# Patient Record
Sex: MALE | Race: WHITE | NOT HISPANIC OR LATINO | Employment: OTHER | URBAN - NONMETROPOLITAN AREA
[De-identification: names, ages, dates, MRNs, and addresses within clinical notes are randomized per-mention and may not be internally consistent; named-entity substitution may affect disease eponyms.]

---

## 2024-01-24 ENCOUNTER — HOSPITAL ENCOUNTER (OUTPATIENT)
Dept: MRI IMAGING | Facility: HOSPITAL | Age: 69
Discharge: HOME | End: 2024-01-24
Admitting: NURSE PRACTITIONER
Payer: MEDICARE

## 2024-01-24 DIAGNOSIS — R41.3 MEMORY LOSS: Primary | ICD-10-CM

## 2024-01-24 DIAGNOSIS — R41.3 MEMORY LOSS: ICD-10-CM

## 2024-01-24 PROCEDURE — 70551 MRI BRAIN STEM W/O DYE: CPT | Performed by: RADIOLOGY

## 2024-01-24 PROCEDURE — 70551 MRI BRAIN STEM W/O DYE: CPT

## 2024-01-24 NOTE — PROGRESS NOTES
I tried to call him but there was no answer. Please try to contact him and let him know that his MRI shows no acute findings. If he feels like he is still having symptoms I would referral to neurology.

## 2024-04-15 ENCOUNTER — OFFICE VISIT (OUTPATIENT)
Dept: NEUROLOGY | Facility: CLINIC | Age: 69
End: 2024-04-15
Payer: MEDICARE

## 2024-04-15 VITALS
SYSTOLIC BLOOD PRESSURE: 122 MMHG | OXYGEN SATURATION: 94 % | HEIGHT: 68 IN | HEART RATE: 59 BPM | BODY MASS INDEX: 23.04 KG/M2 | WEIGHT: 152 LBS | DIASTOLIC BLOOD PRESSURE: 70 MMHG

## 2024-04-15 DIAGNOSIS — R41.3 MEMORY LOSS: Primary | ICD-10-CM

## 2024-04-15 PROCEDURE — 1159F MED LIST DOCD IN RCRD: CPT | Performed by: NURSE PRACTITIONER

## 2024-04-15 PROCEDURE — 1160F RVW MEDS BY RX/DR IN RCRD: CPT | Performed by: NURSE PRACTITIONER

## 2024-04-15 PROCEDURE — 99214 OFFICE O/P EST MOD 30 MIN: CPT | Performed by: NURSE PRACTITIONER

## 2024-04-15 NOTE — PROGRESS NOTES
"   Neuro Office Visit      Encounter Date: 04/15/2024   Patient Name: Dewayne Garvey  : 1955   MRN: [id number]   PCP:  Jamar Jurado APRN     Chief Complaint:    Chief Complaint   Patient presents with    Memory Loss       History of Present Illness: Dewayne Garvey is a 69 y.o. male who is here today in Neurology for memory loss.    Accompanied by his wife Danuta who assists with history.    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    He retired less than a year ago.    Onset of forgetting appointments/events approximately 6-8 months ago.    His wife noticed that she would tell him about plans or tell him some other information and within 15 to 30 minutes he would have already forgotten.    Danuta notices that he seems overwhelmed by information such as finances.    He is more disengaged and his wife feels he is \"not as sharp\".    Overall he does well with driving but had a recent event where he was driving to the 's office and did not remember which way to turn.  It was very alarming to Danuta as they have been to that office for over 30 years.    Denies stress, anxiety, depression.    Reviewed MRI of the brain with Dewayne and his wife.    MMSE 29/30    MRI Brain Without Contrast (2024 10:45)     Subjective      Past Medical History:   Past Medical History:   Diagnosis Date    Inguinal hernia        Past Surgical History:   Past Surgical History:   Procedure Laterality Date    BASAL CELL CARCINOMA EXCISION  2022    Nose    CLAVICLE SURGERY      INGUINAL HERNIA REPAIR         Family History:   Family History   Problem Relation Age of Onset    Heart disease Mother     Hyperlipidemia Mother     Cancer Father         hodg dis    Cancer Brother         pancreatic       Social History:   Social History     Socioeconomic History    Marital status:    Tobacco Use    Smoking status: Never    Smokeless tobacco: Never   Vaping Use    " Vaping status: Never Used   Substance and Sexual Activity    Alcohol use: Yes     Alcohol/week: 1.0 standard drink of alcohol     Types: 1 Glasses of wine per week    Drug use: No    Sexual activity: Defer       Medications:     Current Outpatient Medications:     LIALDA 1.2 G EC tablet, 2 (Two) Times a Day., Disp: , Rfl:     Allergies:   No Known Allergies    PHQ-9 Total Score:     STEADI Fall Risk Assessment was completed, and patient is at LOW risk for falls.Assessment completed on:4/15/2024    Objective     Physical Exam:   Physical Exam  Vitals reviewed.   Eyes:      Extraocular Movements: EOM normal.      Pupils: Pupils are equal, round, and reactive to light.   Neurological:      Mental Status: He is oriented to person, place, and time.      Coordination: Finger-Nose-Finger Test, Heel to Shin Test and Romberg Test normal.      Gait: Gait is intact. Tandem walk normal.      Deep Tendon Reflexes:      Reflex Scores:       Tricep reflexes are 2+ on the right side and 2+ on the left side.       Bicep reflexes are 2+ on the right side and 2+ on the left side.       Brachioradialis reflexes are 2+ on the right side and 2+ on the left side.       Patellar reflexes are 2+ on the right side and 2+ on the left side.       Achilles reflexes are 2+ on the right side and 2+ on the left side.  Psychiatric:         Speech: Speech normal.         Neurologic Exam     Mental Status   Oriented to person, place, and time.   Oriented to person.   Oriented to place. Oriented to country, city, area, street and number.   Oriented to time. Oriented to year, month, date, day and season.   Registration: recalls 3 of 3 objects. Recall at 5 minutes: recalls 2 of 3 objects.   Attention: normal. Concentration: normal.   Speech: speech is normal   Level of consciousness: alert  Knowledge: good. Able to perform simple calculations.   Able to name object. Able to read. Able to repeat. Able to write. Normal comprehension.     Cranial Nerves  "    CN II   Visual fields full to confrontation.     CN III, IV, VI   Pupils are equal, round, and reactive to light.  Extraocular motions are normal.   CN III: no CN III palsy  CN VI: no CN VI palsy    CN V   Facial sensation intact.     CN VII   Facial expression full, symmetric.     CN VIII   CN VIII normal.     CN IX, X   CN IX normal.   CN X normal.     CN XI   CN XI normal.     CN XII   CN XII normal.     Motor Exam     Strength   Right neck flexion: 5/5  Left neck flexion: 5/5  Right neck extension: 5/5  Left neck extension: 5/5  Right deltoid: 5/5  Left deltoid: 5/5  Right biceps: 5/5  Left biceps: 5/5  Right triceps: 5/5  Left triceps: 5/5  Right wrist flexion: 5/5  Left wrist flexion: 5/5  Right wrist extension: 5/5  Left wrist extension: 5/5  Right interossei: 5/5  Left interossei: 5/5  Right abdominals: 5/5  Left abdominals: 5/5  Right iliopsoas: 5/5  Left iliopsoas: 5/5  Right quadriceps: 5/5  Left quadriceps: 5/5  Right hamstrin/5  Left hamstrin/5  Right glutei: 5/5  Left glutei: 5/5  Right anterior tibial: 5/5  Left anterior tibial: 5/5  Right posterior tibial: 5/5  Left posterior tibial: 5/5  Right peroneal: 5/5  Left peroneal: 5/5  Right gastroc: 5/5  Left gastroc: 5/5    Sensory Exam   Light touch normal.     Gait, Coordination, and Reflexes     Gait  Gait: normal    Coordination   Romberg: negative  Finger to nose coordination: normal  Heel to shin coordination: normal  Tandem walking coordination: normal    Tremor   Resting tremor: absent  Intention tremor: absent  Action tremor: absent    Reflexes   Right brachioradialis: 2+  Left brachioradialis: 2+  Right biceps: 2+  Left biceps: 2+  Right triceps: 2+  Left triceps: 2+  Right patellar: 2+  Left patellar: 2+  Right achilles: 2+  Left achilles: 2+  Right : 2+  Left : 2+       Vital Signs:   Vitals:    04/15/24 0843   BP: 122/70   Pulse: 59   SpO2: 94%   Weight: 68.9 kg (152 lb)   Height: 172.7 cm (67.99\")     Body mass index is " "23.12 kg/m².     Results:   Imaging:   MRI Brain Without Contrast    Result Date: 1/24/2024    No acute findings in the head/brain.   This report was finalized on 1/24/2024 10:54 AM by Dr. Prosper Lezama MD.         Labs:    No results found for: \"CMP\", \"PROTEIN\", \"ANTIMOGAB\", \"[id number]\", \"JCVRESULT\", \"QUANTTBGOLD\", \"CBCDIF\", \"IGGALBSER\"     Assessment / Plan      Assessment/Plan:   Diagnoses and all orders for this visit:    1. Memory loss (Primary)  Comments:  Encouraged visual, shared calendar  Utilize memory games  Adequate nutrition, hydration, sleep           Patient Education:     Reviewed medications, potential side effects and signs and symptoms to report. Discussed risk versus benefits of treatment plan with patient and/or family-including medications, labs and radiology that may be ordered. Addressed questions and concerns during visit. Patient and/or family verbalized understanding and agree with plan. Instructed to call the office with any questions and report to ER with any life-threatening symptoms.     Follow Up:   Return in about 6 months (around 10/15/2024).    I spent 30  minutes face to face with the patient and family. I personally spent 50 percent of this time counseling and discussing diagnosis, diagnostic testing, driving, treatment options, and management .       During this visit the following were done:  Labs Reviewed [x]    Labs Ordered []    Radiology Reports Reviewed [x]    Radiology Ordered []    PCP Records Reviewed [x]    Referring Provider Records Reviewed [x]    ER Records Reviewed []    Hospital Records Reviewed []    History Obtained From Family []    Radiology Images Reviewed [x]    Other Reviewed []    Records Requested []      PAIGE Bonilla  [facility] [place] [facility]  [address]  [place] [facility] [address] [phone]  "

## 2024-04-16 ENCOUNTER — PATIENT ROUNDING (BHMG ONLY) (OUTPATIENT)
Dept: NEUROLOGY | Facility: CLINIC | Age: 69
End: 2024-04-16
Payer: COMMERCIAL

## 2024-10-15 ENCOUNTER — OFFICE VISIT (OUTPATIENT)
Dept: NEUROLOGY | Facility: CLINIC | Age: 69
End: 2024-10-15
Payer: MEDICARE

## 2024-10-15 VITALS
HEART RATE: 89 BPM | OXYGEN SATURATION: 97 % | BODY MASS INDEX: 22.61 KG/M2 | HEIGHT: 68 IN | SYSTOLIC BLOOD PRESSURE: 118 MMHG | WEIGHT: 149.2 LBS | DIASTOLIC BLOOD PRESSURE: 78 MMHG

## 2024-10-15 DIAGNOSIS — R41.3 MEMORY LOSS: Primary | ICD-10-CM

## 2024-10-15 PROCEDURE — 1160F RVW MEDS BY RX/DR IN RCRD: CPT | Performed by: NURSE PRACTITIONER

## 2024-10-15 PROCEDURE — 99213 OFFICE O/P EST LOW 20 MIN: CPT | Performed by: NURSE PRACTITIONER

## 2024-10-15 PROCEDURE — 1159F MED LIST DOCD IN RCRD: CPT | Performed by: NURSE PRACTITIONER

## 2024-10-15 NOTE — PROGRESS NOTES
Neuro Office Visit      Encounter Date: 10/15/2024   Patient Name: Dewayne Garvey  : 1955   MRN: [id number]   PCP:  Jamar Jurado APRN     Chief Complaint:    Chief Complaint   Patient presents with    Memory Loss     F/U       History of Present Illness: Dewayne Garvey is a 69 y.o. male who is here today in Neurology for memory loss.    Last visit with me on 4/15/2024, MMSE 29/30 encouraged adequate nutrition, hydration, sleep, memory games.  History of Present Illness  The patient presents for evaluation of memory issues. He is accompanied by his wife.    He reports a decline in his memory, which he attributes to aging. He occasionally forgets things but does not see this as a crisis. He has been using an address book to help remember things. They use a shared family calendar to keep track of appointments and events.     He does not believe he is depressed.    His wife expresses concern about his memory, noting that he often forgets conversations from the previous day.     She also mentions that he seems less engaged and more absent-minded than before.     She notes that he sometimes forgets directions, even when given immediately prior to leaving.    She believes that his memory issues may be due to fatigue or lack of interest in certain topics. She also mentions that she has taken on more responsibilities since her parents' health declined, which has kept her mind active.      MMSE 28/30    Subjective      Past Medical History:   Past Medical History:   Diagnosis Date    Inguinal hernia        Past Surgical History:   Past Surgical History:   Procedure Laterality Date    BASAL CELL CARCINOMA EXCISION  2022    Nose    CLAVICLE SURGERY      INGUINAL HERNIA REPAIR         Family History:   Family History   Problem Relation Age of Onset    Heart disease Mother     Hyperlipidemia Mother     Cancer Father         hodg dis    Cancer Brother         pancreatic       Social History:   Social  History     Socioeconomic History    Marital status:    Tobacco Use    Smoking status: Never    Smokeless tobacco: Never   Vaping Use    Vaping status: Never Used   Substance and Sexual Activity    Alcohol use: Yes     Alcohol/week: 1.0 standard drink of alcohol     Types: 1 Glasses of wine per week    Drug use: No    Sexual activity: Defer       Medications:     Current Outpatient Medications:     LIALDA 1.2 G EC tablet, 2 (Two) Times a Day., Disp: , Rfl:     Allergies:   No Known Allergies    PHQ-9 Total Score:     STEADI Fall Risk Assessment was completed, and patient is at LOW risk for falls.Assessment completed on:10/15/2024    Objective     Physical Exam:     Neurological Exam  Mental Status  Awake, alert and oriented to person, place and time. Recent and remote memory are intact. Speech is normal. Language is fluent with no aphasia. Attention and concentration are normal. Fund of knowledge is appropriate for level of education. MMSE score: 28.    Cranial Nerves  CN II: Visual acuity is normal.  CN III, IV, VI: Extraocular movements intact bilaterally. Pupils equal round and reactive to light bilaterally.  CN V: Facial sensation is normal.  CN VII: Full and symmetric facial movement.  CN IX, X: Palate elevates symmetrically  CN XI: Shoulder shrug strength is normal.  CN XII: Tongue midline without atrophy or fasciculations.    Motor  Normal muscle bulk throughout. No fasciculations present. Normal muscle tone. Strength is 5/5 throughout all four extremities.    Sensory  Sensation is intact to light touch, pinprick, vibration and proprioception in all four extremities.    Reflexes                                            Right                      Left  Brachioradialis                    2+                         2+  Biceps                                 2+                         2+  Triceps                                2+                         2+  Finger flex                           2+         "                 2+  Hamstring                            2+                         2+  Patellar                                2+                         2+  Achilles                                2+                         2+    Coordination    Finger-to-nose, rapid alternating movements and heel-to-shin normal bilaterally without dysmetria.    Gait  Normal casual, toe, heel and tandem gait.        Vital Signs:   Vitals:    10/15/24 0949   BP: 118/78   Pulse: 89   SpO2: 97%   Weight: 67.7 kg (149 lb 3.2 oz)   Height: 172.7 cm (67.99\")     Body mass index is 22.69 kg/m².     Results:   Results  Imaging  MRI results were normal.    Testing  MMSE score was 28.     Imaging:   No Images in the past 120 days found..     Labs:   No results found for: \"CMP\", \"PROTEIN\", \"ANTIMOGAB\", \"[id number]\", \"JCVRESULT\", \"QUANTTBGOLD\", \"CBCDIF\", \"IGGALBSER\"     Assessment / Plan      Assessment/Plan:   Diagnoses and all orders for this visit:    1. Memory loss (Primary)  Comments:  Consider neuropsych eval         Assessment & Plan  1. Memory issues.  His MMSE score remains within the normal range, albeit with a slight decrease from 29 to 28. The missed date was the only deviation from the previous test. An MRI scan revealed no abnormalities, suggesting that the memory issues could be attributed to the natural aging process and recent long-term. A referral to Dr. Phoebe Porter, a neuropsychologist, was recommended for further evaluation. This could provide additional insights into whether the memory issues are cognitive, depressive, or due to inattentiveness. A follow-up appointment was scheduled for 6 months later to monitor his progress. Should he experience increased memory problems before the next appointment, he is advised to contact me to arrange for testing.    Follow-up  A follow-up visit is scheduled for 6 months from now.    Patient Education:     Reviewed medications, potential side effects and signs and symptoms to " report. Discussed risk versus benefits of treatment plan with patient and/or family-including medications, labs and radiology that may be ordered. Addressed questions and concerns during visit. Patient and/or family verbalized understanding and agree with plan. Instructed to call the office with any questions and report to ER with any life-threatening symptoms.     Follow Up:   Return in about 6 months (around 4/15/2025).    I spent 45 minutes caring for Dewayne on this date of service. This time includes time spent by me in the following activities: preparing for the visit, performing a medically appropriate examination and/or evaluation, counseling and educating the patient/family/caregiver, and documenting information in the medical record.        During this visit the following were done:  Labs Reviewed []    Labs Ordered []    Radiology Reports Reviewed []    Radiology Ordered []    PCP Records Reviewed []    Referring Provider Records Reviewed []    ER Records Reviewed []    Hospital Records Reviewed []    History Obtained From Family []    Radiology Images Reviewed []    Other Reviewed []    Records Requested []      Patient or patient representative verbalized consent for the use of Ambient Listening during the visit with  PAIGE Bonilla for chart documentation. 10/15/2024  09:34 EDT    PAIGE Bonilla   [facility] [place] [facility]  [address]  [facility] [address] [phone]

## 2025-04-02 ENCOUNTER — OFFICE VISIT (OUTPATIENT)
Dept: UROLOGY | Facility: CLINIC | Age: 70
End: 2025-04-02
Payer: MEDICARE

## 2025-04-02 VITALS
WEIGHT: 148.8 LBS | BODY MASS INDEX: 22.55 KG/M2 | HEART RATE: 59 BPM | SYSTOLIC BLOOD PRESSURE: 143 MMHG | HEIGHT: 68 IN | DIASTOLIC BLOOD PRESSURE: 78 MMHG

## 2025-04-02 DIAGNOSIS — R97.20 ELEVATED PROSTATE SPECIFIC ANTIGEN (PSA): Primary | ICD-10-CM

## 2025-04-02 PROCEDURE — 84153 ASSAY OF PSA TOTAL: CPT

## 2025-04-02 PROCEDURE — 84154 ASSAY OF PSA FREE: CPT

## 2025-04-03 ENCOUNTER — PATIENT ROUNDING (BHMG ONLY) (OUTPATIENT)
Dept: UROLOGY | Facility: CLINIC | Age: 70
End: 2025-04-03
Payer: COMMERCIAL

## 2025-04-04 ENCOUNTER — RESULTS FOLLOW-UP (OUTPATIENT)
Dept: UROLOGY | Facility: CLINIC | Age: 70
End: 2025-04-04
Payer: COMMERCIAL

## 2025-04-04 DIAGNOSIS — R97.20 ELEVATED PROSTATE SPECIFIC ANTIGEN (PSA): Primary | ICD-10-CM

## 2025-04-04 LAB
PSA FREE MFR SERPL: 18 %
PSA FREE SERPL-MCNC: 1.49 NG/ML
PSA SERPL-MCNC: 8.3 NG/ML (ref 0–4)

## 2025-04-04 NOTE — TELEPHONE ENCOUNTER
Try to call the patient twice about PSA results however he did not answer.  Left voicemail on both contact numbers and advised to call back to discuss this is soon as possible.

## 2025-04-07 ENCOUNTER — TELEPHONE (OUTPATIENT)
Dept: UROLOGY | Facility: CLINIC | Age: 70
End: 2025-04-07
Payer: COMMERCIAL

## 2025-04-07 NOTE — TELEPHONE ENCOUNTER
Tried to call patient to schedule prostate biopsy and MRI, had to leave a voice mail to call me back.

## 2025-04-07 NOTE — TELEPHONE ENCOUNTER
Patient called back to discuss test results.  Patient wished to proceed forward with an MRI with and without contrast and we will get this scheduled appropriately.  Will tentatively schedule him for a biopsy pending MRI results.  Patient verbalized understanding

## 2025-04-09 ENCOUNTER — HOSPITAL ENCOUNTER (OUTPATIENT)
Dept: MRI IMAGING | Facility: HOSPITAL | Age: 70
Discharge: HOME | End: 2025-04-09
Payer: MEDICARE

## 2025-04-09 DIAGNOSIS — R97.20 ELEVATED PROSTATE SPECIFIC ANTIGEN (PSA): ICD-10-CM

## 2025-04-09 LAB — CREAT BLDA-MCNC: 1.1 MG/DL (ref 0.6–1.3)

## 2025-04-09 PROCEDURE — 25510000002 GADOBENATE DIMEGLUMINE 529 MG/ML SOLUTION

## 2025-04-09 PROCEDURE — A9577 INJ MULTIHANCE: HCPCS

## 2025-04-09 PROCEDURE — 72197 MRI PELVIS W/O & W/DYE: CPT

## 2025-04-09 PROCEDURE — 82565 ASSAY OF CREATININE: CPT

## 2025-04-09 RX ADMIN — GADOBENATE DIMEGLUMINE 13 ML: 529 INJECTION, SOLUTION INTRAVENOUS at 09:06

## 2025-04-10 ENCOUNTER — RESULTS FOLLOW-UP (OUTPATIENT)
Dept: UROLOGY | Facility: CLINIC | Age: 70
End: 2025-04-10
Payer: COMMERCIAL

## 2025-04-10 NOTE — TELEPHONE ENCOUNTER
Called patient advised that MRI did come back with PI-RADS 3 lesion however given trending prostate numbers would recommend to keep his biopsy appointment.  Did educate he may repeat a PSA prior to biopsy to see if it is decreased. he verbalized understanding.

## 2025-04-15 ENCOUNTER — OFFICE VISIT (OUTPATIENT)
Dept: NEUROLOGY | Facility: CLINIC | Age: 70
End: 2025-04-15
Payer: MEDICARE

## 2025-04-15 VITALS
SYSTOLIC BLOOD PRESSURE: 102 MMHG | OXYGEN SATURATION: 97 % | HEART RATE: 59 BPM | DIASTOLIC BLOOD PRESSURE: 64 MMHG | BODY MASS INDEX: 22.31 KG/M2 | WEIGHT: 147.2 LBS | HEIGHT: 68 IN

## 2025-04-15 DIAGNOSIS — R41.3 MEMORY LOSS: Primary | ICD-10-CM

## 2025-04-15 PROBLEM — K51.20 ULCERATIVE PROCTITIS: Status: ACTIVE | Noted: 2025-04-15

## 2025-04-15 PROCEDURE — 1160F RVW MEDS BY RX/DR IN RCRD: CPT | Performed by: NURSE PRACTITIONER

## 2025-04-15 PROCEDURE — 1159F MED LIST DOCD IN RCRD: CPT | Performed by: NURSE PRACTITIONER

## 2025-04-15 PROCEDURE — 99214 OFFICE O/P EST MOD 30 MIN: CPT | Performed by: NURSE PRACTITIONER

## 2025-04-15 NOTE — PROGRESS NOTES
Neuro Office Visit      Encounter Date: 04/15/2025   Patient Name: Dewayne Garvey  : 1955   MRN: [id number]   PCP:  Jamar Jurado APRN     Chief Complaint:    Chief Complaint   Patient presents with    Memory Loss       History of Present Illness: Dewayne Garvey is a 70 y.o. male who is here today in Neurology for memory loss.    Last visit with me on 10/15/2024, consider neurocognitive evaluation.    Accompanied by his wife who assists with history.  History of Present Illness  A decline in memory is reported, which is not perceived as critical but acknowledged as a mild concern but he feels this that prison has led to a decrease in mental discipline, contributing to forgetfulness.     His wife is extremely concerned and has observed an increase in absent-mindedness. A date book is maintained to aid his memory.     No significant issues with driving are reported, except when navigating unfamiliar areas.     There are no instances of disorientation or getting lost in familiar surroundings.     He is currently under evaluation for prostate cancer, a fact he failed to recall during today's visit.  His wife notes concern that he did not tell me about the potential diagnosis as she feels she would be hyperfocused on it.    His wife noted a change that although he maintains hygiene, he has a tendency to wear the same clothes for several days.      His wife also expresses concern about his ability to focus, retain information, and problem-solve.     These changes reportedly began around the time of prison, when adjustments to social security and health insurance were being made.     Denies feeling depressed, it is felt that he has become less engaged in daily activities and responsibilities since retiring. He functions well in social settings.    SOCIAL HISTORY  Marital Status:   Occupations: Retired professor    MMSE 28/30    Subjective      Past Medical History:   Past Medical History:    Diagnosis Date    Inguinal hernia        Past Surgical History:   Past Surgical History:   Procedure Laterality Date    BASAL CELL CARCINOMA EXCISION  09/01/2022    Nose    CLAVICLE SURGERY      INGUINAL HERNIA REPAIR         Family History:   Family History   Problem Relation Age of Onset    Heart disease Mother     Hyperlipidemia Mother     Cancer Father         hodg dis    Cancer Brother         pancreatic       Social History:   Social History     Socioeconomic History    Marital status:    Tobacco Use    Smoking status: Never    Smokeless tobacco: Never   Vaping Use    Vaping status: Never Used   Substance and Sexual Activity    Alcohol use: Yes     Alcohol/week: 1.0 standard drink of alcohol     Types: 1 Glasses of wine per week    Drug use: No    Sexual activity: Defer       Medications:     Current Outpatient Medications:     LIALDA 1.2 G EC tablet, 2 (Two) Times a Day., Disp: , Rfl:     Allergies:   No Known Allergies    PHQ-9 Total Score:     STEADI Fall Risk Assessment was completed, and patient is at LOW risk for falls.Assessment completed on:4/15/2025    Objective     Physical Exam:     Neurological Exam  Mental Status  Awake, alert and oriented to person, place and time. Recalls 3 of 3 objects immediately. At 5 minutes recalls 2 of 3 objects. Recalls 2 of 3 objects with prompting. Speech is normal. Language is fluent with no aphasia. Attention and concentration are normal. Fund of knowledge is appropriate for level of education. MMSE score: 28.    Cranial Nerves  CN II: Visual acuity is normal.  CN III, IV, VI: Extraocular movements intact bilaterally. Pupils equal round and reactive to light bilaterally.  CN V: Facial sensation is normal.  CN VII: Full and symmetric facial movement.  CN IX, X: Palate elevates symmetrically  CN XI: Shoulder shrug strength is normal.  CN XII: Tongue midline without atrophy or fasciculations.    Motor  Normal muscle bulk throughout. No fasciculations present.  "Normal muscle tone. Strength is 5/5 throughout all four extremities.    Sensory  Sensation is intact to light touch, pinprick, vibration and proprioception in all four extremities.    Reflexes                                            Right                      Left  Brachioradialis                    2+                         2+  Biceps                                 2+                         2+  Triceps                                2+                         2+  Finger flex                           2+                         2+  Hamstring                            2+                         2+  Patellar                                2+                         2+  Achilles                                2+                         2+    Coordination    Finger-to-nose, rapid alternating movements and heel-to-shin normal bilaterally without dysmetria.    Gait  Normal casual, toe, heel and tandem gait.        Vital Signs:   Vitals:    04/15/25 1037   BP: 102/64   Pulse: 59   SpO2: 97%   Weight: 66.8 kg (147 lb 3.2 oz)   Height: 172.7 cm (67.99\")     Body mass index is 22.39 kg/m².     Results:   Results  Imaging   - MRI of the brain: Some volume loss and a little bit of white matter disease.    Diagnostic Testing   - MMSE: Score was 26 out of 30.     Imaging:   MRI Prostate With & Without Contrast  Result Date: 4/10/2025  1.  Prostate enlargement with central hyperplasia constituting changes of BPH. 2.  Transitional zone: Partially circumscribed 14.7 mm lesion in the left anterior-posterior transitional zone in the mid gland-apical gland region (TZa-TZp) that demonstrates increased DWI signal, decreased ADC signal, and decreased T2W signal.  PIRADS 3 - Intermediate.  The presence of clinically significant cancer is equivocal. 3.  Urinary bladder wall thickening with trabeculation indicative of chronic partial bladder obstruction.  ASSESSMENT:  PIRADS 3 - Intermediate.  The presence of clinically significant " "cancer is equivocal.  This report was finalized on 4/10/2025 10:22 AM by Dr. Jevon Meyers MD.         Labs:   No results found for: \"CMP\", \"PROTEIN\", \"ANTIMOGAB\", \"XWJLVO9VKHS\", \"JCVRESULT\", \"QUANTTBGOLD\", \"CBCDIF\", \"IGGALBSER\"     Assessment / Plan      Assessment/Plan:   Diagnoses and all orders for this visit:    1. Memory loss (Primary)  -     Ambulatory Referral to Neuropsychology         Assessment & Plan  1. Memory impairment.  His Mini-Mental State Examination (MMSE) score has shown a slight decrease from 28/30 to 26/30, but it remains within the normal range. The MRI scan revealed minimal volume loss and mild white matter disease, which are not alarming findings. His memory issues appear to be age-related rather than indicative of a serious cognitive disorder. A referral will be made to Dr. Phoebe Porter for a neurocognitive evaluation to further assess his condition. If the evaluation indicates a cognitive issue, additional testing and potential treatment options, including new medications for dementia, will be considered.    Follow-up  The patient will follow up in 6 months.    Patient Education:     Reviewed medications, potential side effects and signs and symptoms to report. Discussed risk versus benefits of treatment plan with patient and/or family-including medications, labs and radiology that may be ordered. Addressed questions and concerns during visit. Patient and/or family verbalized understanding and agree with plan. Instructed to call the office with any questions and report to ER with any life-threatening symptoms.     Follow Up:   Return in about 6 months (around 10/15/2025).    I spent 40 minutes caring for Dewayne on this date of service. This time includes time spent by me in the following activities: preparing for the visit, performing a medically appropriate examination and/or evaluation, counseling and educating the patient/family/caregiver, referring and communicating with other health " care professionals, and documenting information in the medical record.        During this visit the following were done:  Labs Reviewed []    Labs Ordered []    Radiology Reports Reviewed []    Radiology Ordered []    PCP Records Reviewed []    Referring Provider Records Reviewed []    ER Records Reviewed []    Hospital Records Reviewed []    History Obtained From Family []    Radiology Images Reviewed []    Other Reviewed []    Records Requested []      Patient or patient representative verbalized consent for the use of Ambient Listening during the visit with  PAIGE Bonilla for chart documentation. 4/15/2025  14:22 EDT    PAIGE Bonilla   [facility] NE[facility]  [address]  [place] [address] [phone]

## 2025-04-16 ENCOUNTER — TELEPHONE (OUTPATIENT)
Dept: UROLOGY | Facility: CLINIC | Age: 70
End: 2025-04-16
Payer: COMMERCIAL

## 2025-04-22 ENCOUNTER — TELEPHONE (OUTPATIENT)
Dept: NEUROLOGY | Facility: CLINIC | Age: 70
End: 2025-04-22
Payer: COMMERCIAL

## 2025-04-22 NOTE — TELEPHONE ENCOUNTER
Phoebe Porter's office called to get the order for the referral with the Providers electronic signature on faxed over.     Can we also make sure the order with providers electronic signature comes over with the referral in the future.    Fax was sent over to [phone]

## 2025-05-01 ENCOUNTER — TELEPHONE (OUTPATIENT)
Dept: UROLOGY | Facility: CLINIC | Age: 70
End: 2025-05-01
Payer: COMMERCIAL

## 2025-05-01 ENCOUNTER — RESULTS FOLLOW-UP (OUTPATIENT)
Dept: NEUROLOGY | Facility: CLINIC | Age: 70
End: 2025-05-01
Payer: COMMERCIAL

## 2025-05-01 NOTE — TELEPHONE ENCOUNTER
Caller: SUELLEN CANO    Best call back number: [phone] (home)       What is the best time to reach you: ANYTIME    Who are you requesting to speak with (clinical staff, provider,  specific staff member): RAHEEM      What was the call regarding: PT REQUESTING TO SPEAK TO RAHEEM ABOUT BLOOD WORK, MIGHT NEED ANOTHER, TO SEE IF BIOPSY NEEDS TO BE DONE OR NOT. PLEASE CALL BACK TO ADVISE.

## 2025-05-01 NOTE — PROGRESS NOTES
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  The recommendation given was to consider a memory enhancing medication for memory protection and cognitive stability.  If he is interested in in starting one of those medicines I will be happy to write the prescription for him.

## 2025-05-01 NOTE — TELEPHONE ENCOUNTER
Routing to Diego. Diego said the patient was welcome to come in prior to the BX and see if his levels dropped back to a normal range and if so we can cancel the BX. The pt said they would come by today.

## 2025-05-02 ENCOUNTER — LAB (OUTPATIENT)
Dept: UROLOGY | Facility: CLINIC | Age: 70
End: 2025-05-02
Payer: MEDICARE

## 2025-05-02 DIAGNOSIS — R97.20 ELEVATED PROSTATE SPECIFIC ANTIGEN (PSA): Primary | ICD-10-CM

## 2025-05-02 PROCEDURE — 84153 ASSAY OF PSA TOTAL: CPT

## 2025-05-02 NOTE — TELEPHONE ENCOUNTER
"Relay     \"Per Provider, neuropsychological evaluation noted some mild cognitive decrease in the areas of visual memory and visual constructive ability.  The recommendation given was to consider a memory enhancing medication for memory protection and cognitive stability.  If he is interested in in starting one of those medicines I will be happy to write the prescription for him and to let us know\"        "

## 2025-05-03 LAB — PSA SERPL-MCNC: 7.74 NG/ML (ref 0–4)

## 2025-05-05 ENCOUNTER — RESULTS FOLLOW-UP (OUTPATIENT)
Dept: UROLOGY | Facility: CLINIC | Age: 70
End: 2025-05-05
Payer: COMMERCIAL

## 2025-05-05 NOTE — TELEPHONE ENCOUNTER
I spoke with the pt going over his PSA letting him know that it did decrease, however a biopsy in office was still recommended. The pt verbalized understanding.                       ----- Message from Alfredo Bernard sent at 5/5/2025  8:35 AM EDT -----  Please let patient know PSA did decrease to a 7.7 but we would still recommend to undergo biopsy in office.  Thank you.  ----- Message -----  From: Lab, Background User  Sent: 5/3/2025   2:38 AM EDT  To: Alfredo Bernard PA-C

## 2025-05-13 ENCOUNTER — TELEPHONE (OUTPATIENT)
Dept: NEUROLOGY | Facility: CLINIC | Age: 70
End: 2025-05-13
Payer: COMMERCIAL

## 2025-05-13 NOTE — TELEPHONE ENCOUNTER
Caller: Danuta Ragland    Relationship: Emergency Contact    Best call back number: [phone]    What is the best time to reach you: WITHIN THE NEXT FEW HOURS IF POSSIBLE. ANYTIME IN THE AFTERNOON IS ALSO GOOD. IF WE ARE NOT ABLE TO REACH HER, PLEASE LEAVE A VOICEMAIL WITH A GOOD TIME TO RETURN THE CALL WHERE PAIGE CODY CAN BE REACHED DIRECTLY.    Who are you requesting to speak with (clinical staff, provider,  specific staff member): PAIGE CODY    What was the call regarding: HAS FURTHER QUESTIONS THEY ARE CONCERNED ABOUT REGARDING THE LAST MESSAGE THAT WAS RELAYED TO THEM. PATIENT'S WIFE IS VERY ANXIOUS FOR RESULTS, AS HER FATHER IS IN THE NURSING HOME AND IS NOT EXPECTED TO LIVE LONG.    PLEASE REVIEW AND ADVISE

## 2025-05-15 ENCOUNTER — PROCEDURE VISIT (OUTPATIENT)
Dept: UROLOGY | Facility: CLINIC | Age: 70
End: 2025-05-15
Payer: MEDICARE

## 2025-05-15 VITALS
WEIGHT: 146.2 LBS | HEIGHT: 68 IN | HEART RATE: 67 BPM | BODY MASS INDEX: 22.16 KG/M2 | SYSTOLIC BLOOD PRESSURE: 136 MMHG | DIASTOLIC BLOOD PRESSURE: 91 MMHG

## 2025-05-15 DIAGNOSIS — R97.20 ELEVATED PROSTATE SPECIFIC ANTIGEN (PSA): Primary | ICD-10-CM

## 2025-05-15 RX ORDER — GENTAMICIN 40 MG/ML
80 INJECTION, SOLUTION INTRAMUSCULAR; INTRAVENOUS ONCE
Status: COMPLETED | OUTPATIENT
Start: 2025-05-15 | End: 2025-05-15

## 2025-05-15 RX ADMIN — GENTAMICIN 80 MG: 40 INJECTION, SOLUTION INTRAMUSCULAR; INTRAVENOUS at 13:39

## 2025-05-15 NOTE — PROGRESS NOTES
Chief Complaint:      Chief Complaint   Patient presents with    Elevated PSA     Prostate biopsy        HPI:   70 y.o. male with an elevated PSA at 7.740.  He presents today for prostate biopsy.  He had a PI-RADS 3 lesion noted on the MRI scan    Past Medical History:     Past Medical History:   Diagnosis Date    Inguinal hernia        Current Meds:     Current Outpatient Medications   Medication Sig Dispense Refill    ciprofloxacin (Cipro) 500 MG tablet Take one tablet twice a day before procedure 4 tablet 0    diazePAM (VALIUM) 10 MG tablet One tablet night before procedure at bedtime and one morning of one hour prior to procedure 2 tablet 0    LIALDA 1.2 G EC tablet 2 (Two) Times a Day.      Sodium Phosphates ([employer] Enema Disposable) 19-7 GM/118ML enema Use morning of the procedure 1 enema 0     No current facility-administered medications for this visit.        Allergies:      No Known Allergies     Past Surgical History:     Past Surgical History:   Procedure Laterality Date    BASAL CELL CARCINOMA EXCISION  09/01/2022    Nose    CLAVICLE SURGERY      INGUINAL HERNIA REPAIR         Social History:     Social History     Socioeconomic History    Marital status:    Tobacco Use    Smoking status: Never    Smokeless tobacco: Never   Vaping Use    Vaping status: Never Used   Substance and Sexual Activity    Alcohol use: Yes     Alcohol/week: 1.0 standard drink of alcohol     Types: 1 Glasses of wine per week    Drug use: No    Sexual activity: Defer       Family History:     Family History   Problem Relation Age of Onset    Heart disease Mother     Hyperlipidemia Mother     Cancer Father         hodg dis    Cancer Brother         pancreatic       Review of Systems:     Review of Systems   Constitutional: Negative.    HENT: Negative.     Eyes: Negative.    Respiratory: Negative.     Cardiovascular: Negative.    Gastrointestinal: Negative.    Endocrine: Negative.    Musculoskeletal: Negative.     Allergic/Immunologic: Negative.    Neurological: Negative.    Hematological: Negative.    Psychiatric/Behavioral: Negative.         Physical Exam:     Physical Exam  Vitals and nursing note reviewed.   Constitutional:       Appearance: He is well-developed.   HENT:      Head: Normocephalic and atraumatic.   Eyes:      Conjunctiva/sclera: Conjunctivae normal.      Pupils: Pupils are equal, round, and reactive to light.   Cardiovascular:      Rate and Rhythm: Normal rate and regular rhythm.      Heart sounds: Normal heart sounds.   Pulmonary:      Effort: Pulmonary effort is normal.      Breath sounds: Normal breath sounds.   Abdominal:      General: Bowel sounds are normal.      Palpations: Abdomen is soft.   Musculoskeletal:         General: Normal range of motion.      Cervical back: Normal range of motion.   Skin:     General: Skin is warm and dry.   Neurological:      Mental Status: He is alert and oriented to person, place, and time.      Deep Tendon Reflexes: Reflexes are normal and symmetric.   Psychiatric:         Behavior: Behavior normal.         Thought Content: Thought content normal.         Judgment: Judgment normal.         I have reviewed the following portions of the patient's history: Allergies, current medications, past family history, past medical history, past social history, past surgical history, problem list, and ROS and confirm it is accurate.    Recent Image (CT and/or KUB):      CT Abdomen and Pelvis: No results found for this or any previous visit.       CT Stone Protocol: No results found for this or any previous visit.       KUB: No results found for this or any previous visit.       Labs (past 3 months):      Lab on 05/02/2025   Component Date Value Ref Range Status    PSA 05/02/2025 7.740 (H)  0.000 - 4.000 ng/mL Final   Hospital Outpatient Visit on 04/09/2025   Component Date Value Ref Range Status    Creatinine 04/09/2025 1.10  0.60 - 1.30 mg/dL Final    Serial Number:  [id number]:  [id number]   Office Visit on 04/02/2025   Component Date Value Ref Range Status    PSA 04/02/2025 8.3 (H)  0.0 - 4.0 ng/mL Final    Roche ECLIA methodology.  According to the American Urological Association, Serum PSA should  decrease and remain at undetectable levels after radical  prostatectomy. The AUA defines biochemical recurrence as an initial  PSA value 0.2 ng/mL or greater followed by a subsequent confirmatory  PSA value 0.2 ng/mL or greater.  Values obtained with different assay methods or kits cannot be used  interchangeably. Results cannot be interpreted as absolute evidence  of the presence or absence of malignant disease.    PSA, Free 04/02/2025 1.49  N/A ng/mL Final    Roche ECLIA methodology.    PSA, Free % 04/02/2025 18.0  % Final    The table below lists the probability of prostate cancer for  men with non-suspicious LIEN results and total PSA between  4 and 10 ng/mL, by patient age (Angel et al, SOLOMON 1998,  279:1542).                    % Free PSA       50-64 yr        65-75 yr                    0.00-10.00%        56%             55%                   10.01-15.00%        24%             35%                   15.01-20.00%        17%             23%                   20.01-25.00%        10%             20%                        >25.00%         5%              9%  Please note:  Angel et al did not make specific                recommendations regarding the use of                percent free PSA for any other population                of men.        Procedure:   Prostate ultrasound and biopsy:  70 year-old white male with a history of an elevated PSA and a significant increase in his risk for prostate cancer.  We discussed the prostate biopsy at length.  We discussed the significant risks of anesthesia, bleeding, infection, urosepsis, purported effects on erectile function, and rectal bleeding requiring surgical intervention.  He was given a pre-procedural enema.  He was pretreated  with ciprofloxacin for 3 days.  He was given periprocedural gentamicin at the dose of 80 mg in an intramuscular fashion and given post biopsy clindamycin for 3 days.  Following an extensive informed consent, he was brought to the operative suite, placed in the left lateral decubitus position.  He was given his prophylactic gentamicin.  The rectum was anesthetized with 20 mL of 2% viscous Xylocaine jelly.  I then used the BK biplanar probe inserted into the rectum and made measurements of the prostate.  The height was 4.71 cm, the width was 5.99 cm, and the length was 5.32 cm for a volume of 78.05 g.  There were no obvious hypoechoic areas.  There was no intravesical median lobe.  There was minimal calcification between the transition and peripheral zone.  I then injected 20 mL of 1% Xylocaine in the perirectal area and raised of skin wheal to provide anesthesia after an adequate period of topical anesthesia. I used the Biopty gun to take a total of 10 cores without complication.  He tolerated this extremely well.  Cores.  There was no bleeding or complications.   Impression: Elevated PSA s/p biopsy.    Assessment/Plan:   Elevated prostate specific antigen-we discussed the diagnosis of elevated prostate-specific antigen.  I explained the pathophysiology of PSA.  It is a serine protease that's function in the male reproductive tract is to facilitate the liquefaction of semen.  It is for this reason the body does not want it freely floating in the serum and why typically bound tightly to albumin.  We discussed why we used both a PSA free and total to determine the need for more aggressive therapy. I discussed the normal range.  Additionally, it was in the range of 1 to 4, but more recently has been downgraded to something less than 2 or even approaching 1.  I discussed the risk of family history, particularly the fact that the average male has a 14% risk of prostate cancer and that in the face of a positive diagnosis  in a father it will tablet and any other first-generation relative continued tablet insofar that a father and brother with prostate cancer will produce almost a 50% risk of prostate cancer.  I discussed the use of the temporal use of PSA as the best option for monitoring.  We also discussed the fact that an elevated PSA is an isolated event does not mean that this is prostate cancer and should not engender worry in this regard. I discussed other things that can elevate PSA including constipation, prostatitis, infection, recent intercourse etc., as well as the risks and benefits associated with this.  Also discussed the fact that this is with a dilutional test and as a consequence of such were present produce slight variations on a single specimen.  Further discussed the risks and benefits of a prostate biopsy as well.              This document has been electronically signed by SANDY KHALIL MD May 15, 2025 13:01 EDT    Dictated Utilizing Dragon Dictation: Part of this note may be an electronic transcription/translation of spoken language to printed text using the Dragon Dictation System.

## 2025-05-19 LAB — REF LAB TEST METHOD: NORMAL

## 2025-05-20 ENCOUNTER — TELEPHONE (OUTPATIENT)
Dept: UROLOGY | Facility: CLINIC | Age: 70
End: 2025-05-20
Payer: COMMERCIAL

## 2025-05-20 NOTE — TELEPHONE ENCOUNTER
Manda Stern called from pathology and is sending over patient's pathology report. He is suspicious for prostate cancer.

## 2025-05-22 ENCOUNTER — OFFICE VISIT (OUTPATIENT)
Dept: UROLOGY | Facility: CLINIC | Age: 70
End: 2025-05-22
Payer: MEDICARE

## 2025-05-22 VITALS
SYSTOLIC BLOOD PRESSURE: 160 MMHG | BODY MASS INDEX: 22.22 KG/M2 | HEIGHT: 68 IN | DIASTOLIC BLOOD PRESSURE: 83 MMHG | HEART RATE: 59 BPM | WEIGHT: 146.6 LBS

## 2025-05-22 DIAGNOSIS — R97.20 ELEVATED PROSTATE SPECIFIC ANTIGEN (PSA): Primary | ICD-10-CM

## 2025-05-22 NOTE — PROGRESS NOTES
Chief Complaint:      Chief Complaint   Patient presents with    1 week post op prostate biopsy        HPI:   70 y.o. male returns today status post a biopsy no agent orange exposure positive family history no complications including sepsis urinary retention hematuria I went to get a connect test I will follow-up with him based on this    Past Medical History:     Past Medical History:   Diagnosis Date    Inguinal hernia        Current Meds:     Current Outpatient Medications   Medication Sig Dispense Refill    LIALDA 1.2 G EC tablet 2 (Two) Times a Day.       No current facility-administered medications for this visit.        Allergies:      No Known Allergies     Past Surgical History:     Past Surgical History:   Procedure Laterality Date    BASAL CELL CARCINOMA EXCISION  09/01/2022    Nose    CLAVICLE SURGERY      INGUINAL HERNIA REPAIR         Social History:     Social History     Socioeconomic History    Marital status:    Tobacco Use    Smoking status: Never    Smokeless tobacco: Never   Vaping Use    Vaping status: Never Used   Substance and Sexual Activity    Alcohol use: Yes     Alcohol/week: 1.0 standard drink of alcohol     Types: 1 Glasses of wine per week    Drug use: No    Sexual activity: Defer       Family History:     Family History   Problem Relation Age of Onset    Heart disease Mother     Hyperlipidemia Mother     Cancer Father         hodg dis    Cancer Brother         pancreatic       Review of Systems:     Review of Systems   Constitutional: Negative.    HENT: Negative.     Eyes: Negative.    Respiratory: Negative.     Cardiovascular: Negative.    Gastrointestinal: Negative.    Endocrine: Negative.    Musculoskeletal: Negative.    Allergic/Immunologic: Negative.    Neurological: Negative.    Hematological: Negative.    Psychiatric/Behavioral: Negative.         Physical Exam:     Physical Exam  Vitals and nursing note reviewed.   Constitutional:       Appearance: He is well-developed.    HENT:      Head: Normocephalic and atraumatic.   Eyes:      Conjunctiva/sclera: Conjunctivae normal.      Pupils: Pupils are equal, round, and reactive to light.   Cardiovascular:      Rate and Rhythm: Normal rate and regular rhythm.      Heart sounds: Normal heart sounds.   Pulmonary:      Effort: Pulmonary effort is normal.      Breath sounds: Normal breath sounds.   Abdominal:      General: Bowel sounds are normal.      Palpations: Abdomen is soft.   Musculoskeletal:         General: Normal range of motion.      Cervical back: Normal range of motion.   Skin:     General: Skin is warm and dry.   Neurological:      Mental Status: He is alert and oriented to person, place, and time.      Deep Tendon Reflexes: Reflexes are normal and symmetric.   Psychiatric:         Behavior: Behavior normal.         Thought Content: Thought content normal.         Judgment: Judgment normal.         I have reviewed the following portions of the patient's history: Allergies, current medications, past family history, past medical history, past social history, past surgical history, problem list, and ROS and confirm it is accurate.    Recent Image (CT and/or KUB):      CT Abdomen and Pelvis: No results found for this or any previous visit.       CT Stone Protocol: No results found for this or any previous visit.       KUB: No results found for this or any previous visit.       Labs (past 3 months):      Procedure visit on 05/15/2025   Component Date Value Ref Range Status    Reference Lab Report 05/15/2025    Final                    Value:[facility]  [address] [place] [address]  Phone: [phone] or [phone]  Fax: [phone]  Rodger Hay M.D., Medical Director    PATIENT NAME                           LABORATORY NO.  [id number]  SUELLEN CANO CALLIE.                    [id number]  [id number]                         AGE              SEX  SSN           CLIENT REF #  [facility]                      70      1955      [id number]   [id number]    GASTROENTEROLOGY & UROLOGY         REQUESTING M.D.     ATTENDING M.D.     COPY TO.  DARIUSZ KHALIL,  [address] [address]                   DATE COLLECTED      DATE RECEIVED      DATE REPORTED  05/15/2025          05/15/2025         05/19/2025    DIAGNOSIS:  A.   PROSTATE, NEEDLE CORE BIOPSY, RIGHT MID:  Benign prostatic tissue  B.   PROSTATE, NEEDLE CORE BIOPSY, RIGHT BASE:  Atypical small acinar proliferation (see comment)  C.   PROSTATE, NEEDLE CORE                           BIOPSY, RIGHT LATERAL MID:  Benign prostatic tissue  D.   PROSTATE, NEEDLE CORE BIOPSY, RIGHT LATERAL BASE:  Benign prostatic tissue  E.   PROSTATE, NEEDLE CORE BIOPSY, LEFT MID:  Benign prostatic tissue with focal acute inflammation  F.   PROSTATE, NEEDLE CORE BIOPSY, LEFT BASE:  Benign prostatic tissue  G.   PROSTATE, NEEDLE CORE BIOPSY, LEFT LATERAL MID:  Atypical small acinar proliferation, highly suspicious for malignancy (see  comment)  H.   PROSTATE, NEEDLE CORE BIOPSY, LEFT LATERAL BASE:  Benign prostatic tissue    SEE COMMENT.     CAM    COMMENT:  PIN-4 stains with appropriate controls are performed on blocks  B1 and G1 to further evaluate rare atypical glands.  The glands of interest in  specimen B show absence of a basal cell layer on HMWCK and p63 and absence  of cytoplasmic reactivity with racemase, supporting atypical small acinar  proliferation.  The glands of interest in specimen G show absence of a basal cell  layer on HMWCK and p63 accompanied by apical cytoplasmic                           reactivity with  racemase, highly suspicious for malignancy.  Additional sampling of this area  may be of benefit.    CLINICAL HISTORY:  Elevated prostate specific antigen (PSA)    SPECIMENS RECEIVED:  A.  PROSTATE, NEEDLE CORE BIOPSY , RIGHT MID  B.  PROSTATE, NEEDLE CORE BIOPSY , RIGHT BASE  C.   "PROSTATE, NEEDLE CORE BIOPSY , RIGHT LATERAL MID  D.  PROSTATE, NEEDLE CORE BIOPSY , RIGHT LATERAL BASE  E.  PROSTATE, NEEDLE CORE BIOPSY , LEFT MID  F.  PROSTATE, NEEDLE CORE BIOPSY , LEFT BASE  G.  PROSTATE, NEEDLE CORE BIOPSY , LEFT LATERAL MID  H.  PROSTATE, NEEDLE CORE BIOPSY , LEFT LATERAL BASE    MICROSCOPIC DESCRIPTION:  Tissue blocks are prepared and slides are examined microscopically on all  specimens. See diagnosis for details.    The internal and external (both positive and negative) controls reacted  appropriately. Some of our immunohistochemical and in situ hybridization  studies are performed as analyte specific reagents. The following statement  applies to those tests: This                           test was developed, and its performance  characteristics determined by [facility]. It has not been  cleared or approved by the US Food and Drug Administration. However, the  FDA has determined that approval and clearance are not necessary.    Professional interpretation rendered by Savannah Trujillo M.D., F.C.A.P. at  [facility], [employer], [address] [place] [address].    GROSS DESCRIPTION:  A.  Labeled \"right mid\", consisting of 1 tan needle core measuring 1.5 x 0.1 x  0.1 cm, submitted entirely in 1 cassette.  SOG  B.  Labeled \"right base\", consisting of 1 tan needle core measuring 2.1 x 0.1 x  0.1 cm, submitted entirely in 1 cassette.  C.  Labeled \"right lateral mid\", consisting of 1 tan needle core measuring 1.5 x  0.1 x 0.1 cm, submitted entirely in 1 cassette.  D.  Labeled \"right lateral base\", consisting of 1 tan needle core measuring 0.9  x 0.1 x 0.1 cm, submitted entirely 1 cassette.  E.  Labeled \"left mid\", consisting of 1 tan needle core measuring 1.6 x 0.1                           x  0.1 cm, submitted entirely 1 cassette.  F.  Labeled \"left base\", consisting of 1 tan needle core measuring 1.5 x 0.1 x  0.1 cm, submitted entirely 1 cassette.  G.  Labeled \"left lateral mid\", " "consisting of 1 tan needle core measuring 1.4 x  0.1 x 0.1 cm, submitted entirely 1 cassette.  H.  Labeled \"left lateral base\", consisting of 1 tan needle core measuring 0.8 x  0.1 x 0.1 cm, submitted entirely in 1 cassette.    REVIEWED, DIAGNOSED AND ELECTRONICALLY  SIGNED BY:    Savannah Trujillo M.D., FNessaCNessaANessaP.  CPT CODES:  88305x8, 88344x2     Lab on 05/02/2025   Component Date Value Ref Range Status    PSA 05/02/2025 7.740 (H)  0.000 - 4.000 ng/mL Final   Hospital Outpatient Visit on 04/09/2025   Component Date Value Ref Range Status    Creatinine 04/09/2025 1.10  0.60 - 1.30 mg/dL Final    Serial Number: [id number]:  [id number]   Office Visit on 04/02/2025   Component Date Value Ref Range Status    PSA 04/02/2025 8.3 (H)  0.0 - 4.0 ng/mL Final    Roche ECLIA methodology.  According to the American Urological Association, Serum PSA should  decrease and remain at undetectable levels after radical  prostatectomy. The AUA defines biochemical recurrence as an initial  PSA value 0.2 ng/mL or greater followed by a subsequent confirmatory  PSA value 0.2 ng/mL or greater.  Values obtained with different assay methods or kits cannot be used  interchangeably. Results cannot be interpreted as absolute evidence  of the presence or absence of malignant disease.    PSA, Free 04/02/2025 1.49  N/A ng/mL Final    Roche ECLIA methodology.    PSA, Free % 04/02/2025 18.0  % Final    The table below lists the probability of prostate cancer for  men with non-suspicious LIEN results and total PSA between  4 and 10 ng/mL, by patient age (Angel et al, SOLOMON 1998,  279:1542).                    % Free PSA       50-64 yr        65-75 yr                    0.00-10.00%        56%             55%                   10.01-15.00%        24%             35%                   15.01-20.00%        17%             23%                   20.01-25.00%        10%             20%                        >25.00%         5%              9%  Please note:  " Angel et al did not make specific                recommendations regarding the use of                percent free PSA for any other population                of men.        Procedure:       Assessment/Plan:   Elevated prostate specific antigen-we discussed the diagnosis of elevated prostate-specific antigen.  I explained the pathophysiology of PSA.  It is a serine protease that's function in the male reproductive tract is to facilitate the liquefaction of semen.  It is for this reason the body does not want it freely floating in the serum and why typically bound tightly to albumin.  We discussed why we used both a PSA free and total to determine the need for more aggressive therapy. I discussed the normal range.  Additionally, it was in the range of 1 to 4, but more recently has been downgraded to something less than 2 or even approaching 1.  I discussed the risk of family history, particularly the fact that the average male has a 14% risk of prostate cancer and that in the face of a positive diagnosis in a father it will tablet and any other first-generation relative continued tablet insofar that a father and brother with prostate cancer will produce almost a 50% risk of prostate cancer.  I discussed the use of the temporal use of PSA as the best option for monitoring.  We also discussed the fact that an elevated PSA is an isolated event does not mean that this is prostate cancer and should not engender worry in this regard. I discussed other things that can elevate PSA including constipation, prostatitis, infection, recent intercourse etc., as well as the risks and benefits associated with this.  Also discussed the fact that this is with a dilutional test and as a consequence of such were present produce slight variations on a single specimen.  Further discussed the risks and benefits of a prostate biopsy as well.  Status post a negative biopsy will send biopsy test for connect to determine methylation  status.            This document has been electronically signed by SANDY KHALIL MD May 22, 2025 10:56 EDT    Dictated Utilizing Dragon Dictation: Part of this note may be an electronic transcription/translation of spoken language to printed text using the Dragon Dictation System.

## 2025-06-10 PROBLEM — K40.90 LEFT INGUINAL HERNIA: Status: ACTIVE | Noted: 2025-06-10

## 2025-06-10 NOTE — PROGRESS NOTES
Neuro Office Visit      Encounter Date: 2025   Patient Name: Dewayne Garvey  : 1955   MRN: [id number]   PCP:  Jamar Jurado APRN     Chief Complaint:    Chief Complaint   Patient presents with    Memory Loss       History of Present Illness: Dewayne Garvey is a 70 y.o. male who is here today in Neurology for memory loss.    Last visit with me on 4/15/2025, MMSE 28/30.  Referred to neuropsychology.    Neuropsychology evaluation was conducted on 2025 and showed mild cognitive decrease in visual memory and visual constructive ability.  Recommendations from psychologist Alexa Maciel MS included initiation of a memory enhancing medication for memory protection and cognitive stability.  History of Present Illness  Dewayne is accompanied by his wife who assists with history.    He has been experiencing difficulties with visual-spatial tasks, such as reading his datebook and remembering appointments.  Aligns with the findings of the neuropsychology evaluation.     He occasionally engages in crossword puzzles and performs well in them.    He is aware of his memory issues but does not perceive them as consistently problematic or distressing.     He continues to maintain social engagements and has not isolated himself.     He has not found a full-time or part-time activity that he desires to move he engage in, which may not be a job but could keep him intellectually stimulated.     Mrs. Garvey feels that Dewayne appears more like himself when he engages with others.     He reports no feelings of depression, but there are some symptoms indicative of a lack of productivity and diminished interest in previously enjoyed activities.     He has been sleeping more, which is uncharacteristic for him.    SOCIAL HISTORY:    Occupations: Worked in [employer]    Sleep: Excessive sleeping, uncharacteristic for him    FAMILY HISTORY  - Negative for dementia and Alzheimer's disease in first-degree  relatives.    NEUROPSYCHOLOGICAL EVAL - SCAN - [facility] NEUROPSYCHOLOGICAL SCREENING EVALUATION NEURO BRANN 04/28/2025 (04/28/2025)       Subjective      Past Medical History:   Past Medical History:   Diagnosis Date    Inguinal hernia        Past Surgical History:   Past Surgical History:   Procedure Laterality Date    BASAL CELL CARCINOMA EXCISION  09/01/2022    Nose    CLAVICLE SURGERY      INGUINAL HERNIA REPAIR         Family History:   Family History   Problem Relation Age of Onset    Heart disease Mother     Hyperlipidemia Mother     Cancer Father         hodg dis    Cancer Brother         pancreatic       Social History:   Social History     Socioeconomic History    Marital status:    Tobacco Use    Smoking status: Never    Smokeless tobacco: Never   Vaping Use    Vaping status: Never Used   Substance and Sexual Activity    Alcohol use: Yes     Alcohol/week: 1.0 standard drink of alcohol     Types: 1 Glasses of wine per week    Drug use: No    Sexual activity: Defer       Medications:     Current Outpatient Medications:     LIALDA 1.2 G EC tablet, 2 (Two) Times a Day., Disp: , Rfl:     Allergies:   No Known Allergies    PHQ-9 Total Score:     STEADI Fall Risk Assessment was completed, and patient is at LOW risk for falls.Assessment completed on:6/11/2025    Objective     Physical Exam:     Neurological Exam  Mental Status  Awake, alert and oriented to person, place and time. Recent and remote memory are intact. Speech is normal. Language is fluent with no aphasia. Attention and concentration are normal. Fund of knowledge is appropriate for level of education.    Cranial Nerves  CN II: Visual acuity is normal.  CN III, IV, VI: Extraocular movements intact bilaterally. Pupils equal round and reactive to light bilaterally.  CN V: Facial sensation is normal.  CN VII: Full and symmetric facial movement.  CN IX, X: Palate elevates symmetrically  CN XI: Shoulder shrug strength is normal.  CN XII: Tongue  "midline without atrophy or fasciculations.    Motor  Normal muscle bulk throughout. No fasciculations present. Normal muscle tone. Strength is 5/5 throughout all four extremities.    Sensory  Sensation is intact to light touch, pinprick, vibration and proprioception in all four extremities.    Reflexes                                            Right                      Left  Brachioradialis                    2+                         2+  Biceps                                 2+                         2+  Triceps                                2+                         2+  Finger flex                           2+                         2+  Hamstring                            2+                         2+  Patellar                                2+                         2+  Achilles                                2+                         2+    Coordination    Finger-to-nose, rapid alternating movements and heel-to-shin normal bilaterally without dysmetria.    Gait  Normal casual, toe, heel and tandem gait.        Vital Signs:   Vitals:    06/11/25 0927   BP: 102/70   Pulse: 57   SpO2: 96%   Weight: 67.9 kg (149 lb 12.8 oz)   Height: 170.2 cm (67\")     Body mass index is 23.46 kg/m².     Results:   Results  Imaging   - MRI of the brain: Mild age-related changes, no signs of dementia    Diagnostic Testing   - Memory test: Normal   - MMSE: Scores have been 26 and 28     Imaging:   MRI Prostate With & Without Contrast  Result Date: 4/10/2025  1.  Prostate enlargement with central hyperplasia constituting changes of BPH. 2.  Transitional zone: Partially circumscribed 14.7 mm lesion in the left anterior-posterior transitional zone in the mid gland-apical gland region (TZa-TZp) that demonstrates increased DWI signal, decreased ADC signal, and decreased T2W signal.  PIRADS 3 - Intermediate.  The presence of clinically significant cancer is equivocal. 3.  Urinary bladder wall thickening with trabeculation " "indicative of chronic partial bladder obstruction.  ASSESSMENT:  PIRADS 3 - Intermediate.  The presence of clinically significant cancer is equivocal.  This report was finalized on 4/10/2025 10:22 AM by Dr. Jevon Meyers MD.         Labs:   No results found for: \"CMP\", \"PROTEIN\", \"ANTIMOGAB\", \"GFWIZD1YJTC\", \"JCVRESULT\", \"QUANTTBGOLD\", \"CBCDIF\", \"IGGALBSER\"     Assessment / Plan      Assessment/Plan:   Diagnoses and all orders for this visit:    1. Memory loss (Primary)  Comments:  Engage in cognitive games  Consider cognitive enhancer         Assessment & Plan  1. Memory loss.  The memory test results remain within normal parameters, and the MRI scan does not indicate any abnormalities. There is a noted issue with visual memory and visual constructive memory, but executive function remains intact. The primary concern is mild memory loss, which could be attributed to stress or significant life changes such as longterm. There is no evidence to suggest dementia. The possibility of speech therapy was discussed, but the challenge lies in finding an outpatient speech therapist. The use of memory-enhancing medications such as donepezil or memantine was also considered. The potential side effects of these medications were discussed, including gastrointestinal upset and nightmares. The use of the Elevate pamela was suggested as a tool to improve visual and visual constructive memory. The possibility of conducting a P-tau 217 blood test to detect amyloid plaque was also discussed, contingent on insurance coverage. He was advised to engage in activities that stimulate visual memory, such as puzzles, for a few months to assess their effectiveness. If these activities prove beneficial, they should be continued. If not, the initiation of medication will be considered. He was encouraged to maintain social interactions and stimulation. If there is no improvement in his condition, the use of memory-enhancing medications will be " considered. If the P-tau 217 blood test reveals the presence of amyloid plaque, a referral to a memory care specialist will be made for consideration of medication.    Follow-up  The patient is scheduled for a follow-up visit in October 2025.    Patient Education:     Reviewed medications, potential side effects and signs and symptoms to report. Discussed risk versus benefits of treatment plan with patient and/or family-including medications, labs and radiology that may be ordered. Addressed questions and concerns during visit. Patient and/or family verbalized understanding and agree with plan. Instructed to call the office with any questions and report to ER with any life-threatening symptoms.     Follow Up:   Return for Next scheduled follow up.    I spent 30 minutes caring for Dewayne on this date of service. This time includes time spent by me in the following activities: preparing for the visit, performing a medically appropriate examination and/or evaluation, counseling and educating the patient/family/caregiver, and documenting information in the medical record.        During this visit the following were done:  Labs Reviewed []    Labs Ordered []    Radiology Reports Reviewed []    Radiology Ordered []    PCP Records Reviewed []    Referring Provider Records Reviewed []    ER Records Reviewed []    Hospital Records Reviewed []    History Obtained From Family []    Radiology Images Reviewed []    Other Reviewed [x]    Records Requested []      Patient or patient representative verbalized consent for the use of Ambient Listening during the visit with  PAIGE Bonilla for chart documentation. 6/11/2025  09:31 EDT    PAIGE Bonilla   [facility]  [address] [place] [address] [address]  [facility] [id number] [phone]

## 2025-06-11 ENCOUNTER — OFFICE VISIT (OUTPATIENT)
Dept: NEUROLOGY | Facility: CLINIC | Age: 70
End: 2025-06-11
Payer: MEDICARE

## 2025-06-11 VITALS
OXYGEN SATURATION: 96 % | WEIGHT: 149.8 LBS | HEIGHT: 67 IN | DIASTOLIC BLOOD PRESSURE: 70 MMHG | SYSTOLIC BLOOD PRESSURE: 102 MMHG | BODY MASS INDEX: 23.51 KG/M2 | HEART RATE: 57 BPM

## 2025-06-11 DIAGNOSIS — R41.3 MEMORY LOSS: Primary | ICD-10-CM

## 2025-06-11 PROCEDURE — 1159F MED LIST DOCD IN RCRD: CPT | Performed by: NURSE PRACTITIONER

## 2025-06-11 PROCEDURE — 1160F RVW MEDS BY RX/DR IN RCRD: CPT | Performed by: NURSE PRACTITIONER

## 2025-06-11 PROCEDURE — 99214 OFFICE O/P EST MOD 30 MIN: CPT | Performed by: NURSE PRACTITIONER

## 2025-06-17 LAB — REF LAB TEST METHOD: NORMAL

## 2025-07-08 ENCOUNTER — TELEPHONE (OUTPATIENT)
Dept: UROLOGY | Facility: CLINIC | Age: 70
End: 2025-07-08
Payer: COMMERCIAL

## 2025-07-08 NOTE — TELEPHONE ENCOUNTER
We got the results for the Conform MDX testing Doc ordered he reviewed it and so did Diego and the appointment he has is appropriate and the likelihood of cancer in repeat BX is 53 % the pt was made aware.

## 2025-07-23 ENCOUNTER — TELEPHONE (OUTPATIENT)
Dept: NEUROLOGY | Facility: CLINIC | Age: 70
End: 2025-07-23
Payer: COMMERCIAL

## 2025-07-23 DIAGNOSIS — R41.3 MEMORY LOSS: Primary | ICD-10-CM

## 2025-07-23 RX ORDER — DONEPEZIL HYDROCHLORIDE 5 MG/1
5 TABLET, FILM COATED ORAL TAKE AS DIRECTED
Qty: 60 TABLET | Refills: 6 | Status: SHIPPED | OUTPATIENT
Start: 2025-07-23 | End: 2026-07-23

## 2025-07-23 NOTE — TELEPHONE ENCOUNTER
Provider: TONI CRUM    Caller: Danuta Ragland    Relationship to Patient: Emergency Contact    Pharmacy: [id number]    Phone Number: [phone]    Reason for Call: STATES PATIENT'S COGNITIVE IMPAIRMENT HAS WORSENED. [facility] PATIENT WENT TO THE TAX OFFICE ON HIS OWN THINKING THEY HADN'T FILED THEIR 2024 TAXES. WOULD LIKE TO DISCUSS STARTING MEDICATION FOR PATIENT. PLEASE REVIEW & ADVISE, THANK YOU.

## 2025-07-23 NOTE — TELEPHONE ENCOUNTER
Referral sent.  I would advise her to call and get him on the cancellation list as it can take several months to secure an appointment.

## 2025-07-23 NOTE — TELEPHONE ENCOUNTER
Relayed Providers message to the Pt Spouse Danuta,    Please let them know that I sent a prescription for Aricept to their pharmacy. He will take 1 tablet every night for 7 days and then increase to 2 tablets nightly. Since the memory symptoms are getting worse and he is still so young I would like to see if he is a candidate for the new memory medications. These medicines are not currently used here but they are at [facility] and probably at [facility]. They also have more in-depth testing that can tell us if he has any amyloid plaque in his brain which would point to dementia or Alzheimer's. I feel that his age we should do everything we can to try to preserve the memory is much as possible.     Pt spouse verbalized understanding.      Yes to referral to [facility] Cano

## 2025-07-23 NOTE — TELEPHONE ENCOUNTER
Pt spouse not available. Message will be relayed to have spouse  call office back.    Understanding was verbalized.

## 2025-07-24 ENCOUNTER — TELEPHONE (OUTPATIENT)
Dept: NEUROLOGY | Facility: CLINIC | Age: 70
End: 2025-07-24
Payer: COMMERCIAL

## 2025-07-24 NOTE — TELEPHONE ENCOUNTER
Caller: RaglandLennie    Relationship: Emergency Contact    Best call back number: [phone] OK TO [facility]     What is the best time to reach you:     Who are you requesting to speak with (clinical staff, provider,  specific staff member):   FLOR    Do you know the name of the person who called:     What was the call regarding:   donepezil (Aricept) 5 MG tablet   LENNIE  IS CALLING TO SAY THAT SHE WAS READING UP ON THIS RX AND SAW THAT PT'S WITH GASTRO ISSUES NEEDED TO MAKE SURE THERE PROVIDER WAS AWARE OF THIS. PT HAS (IBS) CONTROLLED BY RX.    LENNIE HAS A QUESTION REGARDING THE REFERRAL FOR TESTING TO SEE IF THE PT IS A CANDIDATE FOR NEW INFUSION RX FOR ALZHEIMER'S. IF HE TAKES THIS RX AND FINDS OUT LATER THAT HE DOESN'T HAVE ALZHEIMER'S, WILL THIS RX BE HARMFUL? SHE SAID THE PT HASN'T BEEN DIAGNOSED WITH ALZHEIMER'S OFFICIALLY. WILL THE PT BE GIVEN THIS NEW RX WITHOUT AN OFFICIAL DX OF ALZHEIMER'S?    PLEASE CALL LENNIE AS SHE HAS QUESTIONS FOR FLOR.